# Patient Record
Sex: MALE | Race: BLACK OR AFRICAN AMERICAN | Employment: UNEMPLOYED | URBAN - METROPOLITAN AREA
[De-identification: names, ages, dates, MRNs, and addresses within clinical notes are randomized per-mention and may not be internally consistent; named-entity substitution may affect disease eponyms.]

---
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
## 2024-03-27 ENCOUNTER — OFFICE VISIT (OUTPATIENT)
Dept: INTERNAL MEDICINE | Age: 19
End: 2024-03-27
Payer: COMMERCIAL

## 2024-03-27 VITALS
BODY MASS INDEX: 19.29 KG/M2 | OXYGEN SATURATION: 95 % | WEIGHT: 115.8 LBS | SYSTOLIC BLOOD PRESSURE: 110 MMHG | DIASTOLIC BLOOD PRESSURE: 70 MMHG | TEMPERATURE: 98.6 F | HEIGHT: 65 IN | HEART RATE: 83 BPM

## 2024-03-27 DIAGNOSIS — F84.0 AUTISM SPECTRUM: ICD-10-CM

## 2024-03-27 DIAGNOSIS — Z02.5 ROUTINE SPORTS PHYSICAL EXAM: Primary | ICD-10-CM

## 2024-03-27 DIAGNOSIS — E55.9 VITAMIN D DEFICIENCY: ICD-10-CM

## 2024-03-27 DIAGNOSIS — Z11.4 ENCOUNTER FOR SCREENING FOR HIV: ICD-10-CM

## 2024-03-27 PROCEDURE — 90686 IIV4 VACC NO PRSV 0.5 ML IM: CPT | Performed by: STUDENT IN AN ORGANIZED HEALTH CARE EDUCATION/TRAINING PROGRAM

## 2024-03-27 PROCEDURE — G8427 DOCREV CUR MEDS BY ELIG CLIN: HCPCS

## 2024-03-27 PROCEDURE — G8420 CALC BMI NORM PARAMETERS: HCPCS

## 2024-03-27 PROCEDURE — G8482 FLU IMMUNIZE ORDER/ADMIN: HCPCS

## 2024-03-27 PROCEDURE — 1036F TOBACCO NON-USER: CPT

## 2024-03-27 PROCEDURE — 99203 OFFICE O/P NEW LOW 30 MIN: CPT

## 2024-03-27 SDOH — ECONOMIC STABILITY: INCOME INSECURITY: HOW HARD IS IT FOR YOU TO PAY FOR THE VERY BASICS LIKE FOOD, HOUSING, MEDICAL CARE, AND HEATING?: NOT VERY HARD

## 2024-03-27 SDOH — ECONOMIC STABILITY: FOOD INSECURITY: WITHIN THE PAST 12 MONTHS, THE FOOD YOU BOUGHT JUST DIDN'T LAST AND YOU DIDN'T HAVE MONEY TO GET MORE.: NEVER TRUE

## 2024-03-27 SDOH — ECONOMIC STABILITY: HOUSING INSECURITY
IN THE LAST 12 MONTHS, WAS THERE A TIME WHEN YOU DID NOT HAVE A STEADY PLACE TO SLEEP OR SLEPT IN A SHELTER (INCLUDING NOW)?: NO

## 2024-03-27 SDOH — ECONOMIC STABILITY: FOOD INSECURITY: WITHIN THE PAST 12 MONTHS, YOU WORRIED THAT YOUR FOOD WOULD RUN OUT BEFORE YOU GOT MONEY TO BUY MORE.: NEVER TRUE

## 2024-03-27 ASSESSMENT — PATIENT HEALTH QUESTIONNAIRE - PHQ9
SUM OF ALL RESPONSES TO PHQ9 QUESTIONS 1 & 2: 0
SUM OF ALL RESPONSES TO PHQ QUESTIONS 1-9: 0
2. FEELING DOWN, DEPRESSED OR HOPELESS: NOT AT ALL
1. LITTLE INTEREST OR PLEASURE IN DOING THINGS: NOT AT ALL
SUM OF ALL RESPONSES TO PHQ QUESTIONS 1-9: 0

## 2024-03-27 NOTE — PROGRESS NOTES
[facility]  [facility]  [address] [place] [address]  Dept: [phone]  Dept Fax: [phone]    Office Progress/Follow Up Note  Date ofpatient's visit: 3/27/2024  Patient's Name:  Ambrocio Yanes YOB: 2005            Patient Care Team:  Quin Barcenas MD as PCP - General (Pediatrics)  ================================================================    REASON FOR VISIT/CHIEF COMPLAINT:  Check-Up (Needs paperwork filled out for disability camp)    HISTORY OF PRESENTING ILLNESS:  History was obtained from: patient's mother.    Ambrocio Yanes is a 18 year old autistic male.    Patient just graduated school and is currently looking for jobs. His mother stated lately he has been feeling anxious about finding jobs and getting more independent. She was wondering if he would benefit from counseling.     Patient is going to participate in [employer]. They wanted Physical exam form to be signed.    Health Maintenance:    - Patient does not smoke,do drugs or drink alcohol.  - Patient is not sexually active.      Patient Active Problem List   Diagnosis    Autism spectrum       Health Maintenance Due   Topic Date Due    COVID-19 Vaccine (1) Never done    Depression Screen  Never done    HIV screen  Never done    Hepatitis C screen  Never done    Flu vaccine (1) 08/01/2023       No Known Allergies      Current Outpatient Medications   Medication Sig Dispense Refill    ibuprofen (IBU) 400 MG tablet Take 1 tablet by mouth every 6 hours as needed for Pain (Patient not taking: Reported on 3/27/2024) 120 tablet 0    ranitidine (ZANTAC) 150 MG tablet Take 1 tablet by mouth 2 times daily (Patient not taking: Reported on 3/27/2024) 60 tablet 3     No current facility-administered medications for this visit.       Social History     Tobacco Use    Smoking status: Never    Smokeless tobacco: Never    Tobacco comments:     mom quit   Substance Use Topics    Alcohol

## 2024-03-27 NOTE — PROGRESS NOTES
Attending Physician Statement  I have discussed the care of Ambrocio Yanes, including pertinent history and exam findings with the resident. I have reviewed the key elements of all parts of the encounter with the resident.. I agree with the assessment, and status of the problem list as documented and this was also documented by the resident. The medication list was reviewed with the resident and is up to date. The return visit should be in 3 months .     Diagnosis Orders   1. Routine sports physical exam  CBC    Comprehensive Metabolic Panel    Lipid Panel      2. Autism spectrum        3. Vitamin D deficiency  Vitamin D 25 Hydroxy      4. Encounter for screening for HIV  HIV Screen           Imani Petersen MD   Attending Physician, [facility]   Faculty, Internal Medicine Residency Program  [facility] Physician [facility]

## 2025-04-26 ENCOUNTER — OFFICE VISIT (OUTPATIENT)
Dept: FAMILY MEDICINE CLINIC | Age: 20
End: 2025-04-26
Payer: COMMERCIAL

## 2025-04-26 VITALS
HEART RATE: 96 BPM | OXYGEN SATURATION: 98 % | SYSTOLIC BLOOD PRESSURE: 128 MMHG | TEMPERATURE: 97.2 F | DIASTOLIC BLOOD PRESSURE: 82 MMHG

## 2025-04-26 DIAGNOSIS — H66.003 NON-RECURRENT ACUTE SUPPURATIVE OTITIS MEDIA OF BOTH EARS WITHOUT SPONTANEOUS RUPTURE OF TYMPANIC MEMBRANES: Primary | ICD-10-CM

## 2025-04-26 DIAGNOSIS — H60.393 OTHER INFECTIVE ACUTE OTITIS EXTERNA OF BOTH EARS: ICD-10-CM

## 2025-04-26 PROCEDURE — 1036F TOBACCO NON-USER: CPT

## 2025-04-26 PROCEDURE — 99213 OFFICE O/P EST LOW 20 MIN: CPT

## 2025-04-26 PROCEDURE — G8421 BMI NOT CALCULATED: HCPCS

## 2025-04-26 PROCEDURE — G8427 DOCREV CUR MEDS BY ELIG CLIN: HCPCS

## 2025-04-26 PROCEDURE — 4130F TOPICAL PREP RX AOE: CPT

## 2025-04-26 RX ORDER — CIPROFLOXACIN AND DEXAMETHASONE 3; 1 MG/ML; MG/ML
4 SUSPENSION/ DROPS AURICULAR (OTIC) 2 TIMES DAILY
Qty: 1 EACH | Refills: 0 | Status: SHIPPED | OUTPATIENT
Start: 2025-04-26 | End: 2025-05-03

## 2025-04-26 ASSESSMENT — ENCOUNTER SYMPTOMS
COUGH: 1
ABDOMINAL PAIN: 0
SORE THROAT: 0
RHINORRHEA: 1
VOMITING: 0
DIARRHEA: 0
EYE ITCHING: 0
EYE REDNESS: 0
EYE PAIN: 0

## 2025-04-26 NOTE — PROGRESS NOTES
[facility] PHYSICIANS [facility], [facility]  [address]  [address]  [facility] [address]  Dept: [phone]  Dept Fax: [phone]    Ambrocio Yanes is a 19 y.o. male who presents to the urgent care today for his medical conditions/complaints as notedbelow.  Ambrocio Yanes is c/o of Ear Pain (Onset for both ear infection started last night. Blood in ears started today)      HPI:     Patient presents to the [facility] with mom for evaluation of bilateral ear pain, onset last night. Ear drainage started today.     Patient Care Team:  Quin Barcenas MD as PCP - General (Pediatrics)      Ear Pain   There is pain in both ears. This is a new problem. The current episode started yesterday. The problem occurs constantly. The problem has been gradually worsening. There has been no fever. The pain is severe. Associated symptoms include coughing and rhinorrhea. Pertinent negatives include no abdominal pain, diarrhea, ear discharge (blood), headaches, hearing loss, neck pain, rash, sore throat or vomiting. He has tried NSAIDs for the symptoms. The treatment provided no relief. His past medical history is significant for a chronic ear infection and a tympanostomy tube.       Past Medical History:   Diagnosis Date    Autism         Current Outpatient Medications   Medication Sig Dispense Refill    amoxicillin-clavulanate (AUGMENTIN) 875-125 MG per tablet Take 1 tablet by mouth 2 times daily for 10 days 20 tablet 0    ciprofloxacin-dexAMETHasone (CIPRODEX) 0.3-0.1 % otic suspension Place 4 drops into both ears 2 times daily for 7 days 1 each 0     No current facility-administered medications for this visit.     No Known Allergies    Subjective:      Review of Systems   Constitutional:  Positive for fatigue. Negative for activity change, appetite change, chills and fever.   HENT:  Positive for congestion, ear pain, postnasal drip and rhinorrhea. Negative for ear discharge